# Patient Record
Sex: MALE | Race: WHITE | NOT HISPANIC OR LATINO
[De-identification: names, ages, dates, MRNs, and addresses within clinical notes are randomized per-mention and may not be internally consistent; named-entity substitution may affect disease eponyms.]

---

## 2017-11-20 ENCOUNTER — APPOINTMENT (OUTPATIENT)
Dept: ORTHOPEDIC SURGERY | Facility: CLINIC | Age: 77
End: 2017-11-20
Payer: MEDICARE

## 2017-11-20 VITALS
HEIGHT: 71 IN | DIASTOLIC BLOOD PRESSURE: 96 MMHG | SYSTOLIC BLOOD PRESSURE: 150 MMHG | HEART RATE: 87 BPM | WEIGHT: 200 LBS | BODY MASS INDEX: 28 KG/M2

## 2017-11-20 PROCEDURE — 99214 OFFICE O/P EST MOD 30 MIN: CPT

## 2017-11-20 RX ORDER — ALFUZOSIN HYDROCHLORIDE 10 MG/1
10 TABLET, EXTENDED RELEASE ORAL
Qty: 90 | Refills: 0 | Status: ACTIVE | COMMUNITY
Start: 2017-09-22

## 2017-11-20 RX ORDER — FINASTERIDE 5 MG/1
5 TABLET, FILM COATED ORAL
Qty: 90 | Refills: 0 | Status: ACTIVE | COMMUNITY
Start: 2017-09-22

## 2017-11-20 RX ORDER — DICYCLOMINE HYDROCHLORIDE 10 MG/1
10 CAPSULE ORAL
Qty: 270 | Refills: 0 | Status: ACTIVE | COMMUNITY
Start: 2017-11-03

## 2017-11-20 RX ORDER — SIMVASTATIN 20 MG/1
20 TABLET, FILM COATED ORAL
Qty: 90 | Refills: 0 | Status: ACTIVE | COMMUNITY
Start: 2017-10-16

## 2017-11-20 RX ORDER — SERTRALINE HYDROCHLORIDE 50 MG/1
50 TABLET, FILM COATED ORAL
Qty: 90 | Refills: 0 | Status: ACTIVE | COMMUNITY
Start: 2017-10-16

## 2019-09-16 ENCOUNTER — EMERGENCY (EMERGENCY)
Facility: HOSPITAL | Age: 79
LOS: 1 days | Discharge: HOME | End: 2019-09-16
Attending: STUDENT IN AN ORGANIZED HEALTH CARE EDUCATION/TRAINING PROGRAM
Payer: MEDICARE

## 2019-09-16 VITALS
RESPIRATION RATE: 19 BRPM | OXYGEN SATURATION: 97 % | HEART RATE: 81 BPM | DIASTOLIC BLOOD PRESSURE: 78 MMHG | SYSTOLIC BLOOD PRESSURE: 138 MMHG | WEIGHT: 205.03 LBS | TEMPERATURE: 98 F | HEIGHT: 71 IN

## 2019-09-16 PROCEDURE — 99283 EMERGENCY DEPT VISIT LOW MDM: CPT | Mod: GC

## 2019-09-16 PROCEDURE — 99283 EMERGENCY DEPT VISIT LOW MDM: CPT

## 2019-09-16 PROCEDURE — 72100 X-RAY EXAM L-S SPINE 2/3 VWS: CPT

## 2019-09-16 PROCEDURE — 72100 X-RAY EXAM L-S SPINE 2/3 VWS: CPT | Mod: 26

## 2019-09-16 RX ORDER — LIDOCAINE 4 G/100G
1 CREAM TOPICAL ONCE
Refills: 0 | Status: COMPLETED | OUTPATIENT
Start: 2019-09-16 | End: 2019-09-16

## 2019-09-16 RX ORDER — DIAZEPAM 5 MG
5 TABLET ORAL ONCE
Refills: 0 | Status: DISCONTINUED | OUTPATIENT
Start: 2019-09-16 | End: 2019-09-16

## 2019-09-16 RX ORDER — ACETAMINOPHEN 500 MG
650 TABLET ORAL ONCE
Refills: 0 | Status: COMPLETED | OUTPATIENT
Start: 2019-09-16 | End: 2019-09-16

## 2019-09-16 RX ORDER — DIAZEPAM 5 MG
1 TABLET ORAL
Qty: 3 | Refills: 0
Start: 2019-09-16 | End: 2019-09-18

## 2019-09-16 RX ADMIN — Medication 5 MILLIGRAM(S): at 09:44

## 2019-09-16 RX ADMIN — Medication 650 MILLIGRAM(S): at 09:44

## 2019-09-16 RX ADMIN — LIDOCAINE 1 PATCH: 4 CREAM TOPICAL at 09:45

## 2019-09-16 NOTE — ED PROVIDER NOTE - NSFOLLOWUPINSTRUCTIONS_ED_ALL_ED_FT
Please follow up with your primary care physician for further care and evaluation.  Please follow up with your spine specialist for further evaluation and care     If testing was performed in the Emergency Department, please bring copies of all test results to your doctor.  Please continue to take all home medications as previously prescribed.    Take valium 5mg once per day as needed for severe pain - do not drive while taking this medication.     Return to hospital for any new or concerning symptoms, including but not limited to: fevers, chills, nausea, vomiting, headache, dizziness, lightheadedness, chest pain, shortness of breath, difficulty breathing, abdominal pain, weakness, or any other new or concerning symptoms.    Take Tylenol up to 650 mg every 6 hours as needed for pain.  Take motrin 600mg every 6 hours as needed for pain

## 2019-09-16 NOTE — ED ADULT NURSE NOTE - NSIMPLEMENTINTERV_GEN_ALL_ED
Implemented All Universal Safety Interventions:  [place] to call system. Call bell, personal items and telephone within reach. Instruct patient to call for assistance. Room bathroom lighting operational. Non-slip footwear when patient is off stretcher. Physically safe environment: no spills, clutter or unnecessary equipment. Stretcher in lowest position, wheels locked, appropriate side rails in place.

## 2019-09-16 NOTE — ED PROVIDER NOTE - CLINICAL SUMMARY MEDICAL DECISION MAKING FREE TEXT BOX
78M, hx of HTN, HLD, polio as child, chronic back pain, presents w chief complaint of acute on chronic low back pain. No other associated sx. Exam as above. Low suspicion for acute pathology. Will perform screening lumbosacral xray, administer pain control, anticipate d/c home for patient to f/u with his primary medical doctor appt later today. Currently stable, no acute distress. Will continue to follow up and re-assess. Case discussed with Attending.  Maico Estrada MD, PGY3 Emergency Medicine

## 2019-09-16 NOTE — ED ADULT TRIAGE NOTE - CHIEF COMPLAINT QUOTE
nontraumatic, nonradiating back pain, +hx back pain, denies bowel/bladder incontinence, denies saddle anesthesia

## 2019-09-16 NOTE — ED PROVIDER NOTE - PROGRESS NOTE DETAILS
xray w/o acute pathology per wet read from radiology. Patient desires d/c home - will see his primary medical doctor today  Spoke with patient extensively regarding current differential diagnosis for ongoing symptoms, and patient acknowledged understanding. All questions and concerns have been addressed with the patient. I have discussed the plan for care and patient is in agreement. Patient is instructed to follow up with Primary Care Provider, and has been given strict return precautions.  Maico Estrada MD, PGY3 Emergency Medicine

## 2019-09-16 NOTE — ED PROVIDER NOTE - PATIENT PORTAL LINK FT
You can access the FollowMyHealth Patient Portal offered by [facility] by registering at the following website: [email or web address]. By joining [facility]’s FollowMyHealth portal, you will also be able to view your health information using other applications (apps) compatible with our system.

## 2019-09-16 NOTE — ED PROVIDER NOTE - PHYSICAL EXAMINATION
General: Well appearing, alert, oriented, no acute distress. Resting in bed.  HEENT: PERRLA EOMI. No trauma/bruising noted to head or face. No lip/tongue/throat swelling noted on exam.  CV: Regular rate and rhythm, S1/S2, +MURMUR.   Lungs: Clear to ascultation bilaterally, no wheezes/crackles/rales noted on exam. Equal chest wall excursion noted.   Abdomen: Soft, non tender, non distended, no guarding or rebound. No CVA tenderness to palpation.   MSK: Full ROM of upper and lower extremities bilaterally. Increased pain to midline low back with ROM of LLE. No tenderness to palpation to extremities. Full ROM of neck. No C-spine tenderness to palpation. Mild tenderness to palpation to midline sacral spine. No gross deformities noted to extremities.  Neuro: Awake, A+O x4, moving all extremities spontaneously. CN 2-12 grossly intact. No nystagmus noted. Strength and sensation grossly intact to all extremities. Ambulatory w/o assist  Extremities: No swelling or edema noted to extremities.   Skin: No rash or bruising noted on exam.

## 2019-09-16 NOTE — ED PROVIDER NOTE - OBJECTIVE STATEMENT
78M, hx o HTN, HLD, polio as child, chronic back pain, presents w chief complaint of low back pain. Patient reports a prior hx of chronic midline low back pain which has been stable and controlled with daily exercises. However, since Saturday 78M, hx of HTN, HLD, polio as child, chronic back pain, presents w chief complaint of low back pain. Patient reports a prior hx of chronic midline low back pain which has been stable and controlled with daily exercises. However, since Saturday morning, has been having severe midline low back pain. DENIES recent falls, trauma, heavy lifting, new exercises, strenuous exercise, or other known precipitating factors. DENIES leg weakness, leg numbness, saddle anesthesia, urinary or fecal incontinence, fevers or chills, nausea or vomiting, night sweats, hemoptysis, headache, dizziness, lightheadedness, blurry vision, chest pain, shortness of breath, cough, abdominal pain, urinary sx, diarrhea or constipation, bloody stools. Denies known cancer hx. Denies hx of back surgeries, vertebral fractures. Meds: statin. Denies tobacco, ethanol, or drug use, allergies.  Patient has scheduled appt to see his primary medical doctor today.

## 2019-09-16 NOTE — ED PROVIDER NOTE - ATTENDING CONTRIBUTION TO CARE
I performed a history and physical exam of the patient and discussed their management with the resident.  I reviewed the resident's note and agree with the documented findings and plan of care except as noted below. My medical decision making and observations are as follows:    78M, hx of HTN, HLD, polio as child, chronic back pain, presents w chief complaint of low back pain. Patient reports a prior hx of chronic midline low back pain which has been stable and controlled with daily exercises, but patient has had worse pain for past 2 days.  no hx of trauma or falls.  denies any f/c, weakness, bowel or bladder incontinence, numbness.  patient in NAD, A&O x 3, heart rrr with +murmur, lungs cta, abd soft ntnd, mild ttp of midline sacrum and left lumbar paraspinals, 5/5 strength all extremities, no saddle anesthesia, antalgic gait.  Due to mild midline tenderness in lumbo/sacral region will get xrays, give pain control and reassess.

## 2019-09-18 ENCOUNTER — APPOINTMENT (OUTPATIENT)
Dept: ORTHOPEDIC SURGERY | Facility: CLINIC | Age: 79
End: 2019-09-18
Payer: MEDICARE

## 2019-09-18 PROBLEM — E78.00 PURE HYPERCHOLESTEROLEMIA, UNSPECIFIED: Chronic | Status: ACTIVE | Noted: 2019-09-16

## 2019-09-18 PROCEDURE — 99214 OFFICE O/P EST MOD 30 MIN: CPT

## 2019-09-18 RX ORDER — METHYLPREDNISOLONE 4 MG/1
4 TABLET ORAL
Qty: 1 | Refills: 2 | Status: ACTIVE | COMMUNITY
Start: 2019-09-18 | End: 1900-01-01

## 2019-09-18 NOTE — HISTORY OF PRESENT ILLNESS
[7] : an average pain level of 7/10 [de-identified] : 77y/o male presents with low back pain for 3 days. Patient reports the pain does not radiate. \par He denies any numbness or tingling. X-ray at [facility] ER on Monday. He states he was prescribed Cyclobenzaprine 10mg, and Diazepam 5mg, which he has been taking, and have helped. He states he is able to walk better now. \par Changing positions from sitting to standing worsens the pain. \par \par Last seen in office on 11/20/2017. \par PT about 8-10 years ago, which helped. \par

## 2019-09-18 NOTE — DISCUSSION/SUMMARY
[de-identified] : 4 days of low back pain.\par lumbar sprain and strain\par lumbar degenerative disc disease\par Medrol and lumbar brochure.\par if no better MRI lumbar\par

## 2019-10-16 ENCOUNTER — APPOINTMENT (OUTPATIENT)
Dept: ORTHOPEDIC SURGERY | Facility: CLINIC | Age: 79
End: 2019-10-16
Payer: MEDICARE

## 2019-10-16 PROCEDURE — 99214 OFFICE O/P EST MOD 30 MIN: CPT

## 2019-10-16 NOTE — PHYSICAL EXAM
[ALL] : dorsalis pedis, posterior tibial, femoral, popliteal, and radial 2+ and symmetric bilaterally [UE/LE] : Sensory: Intact in bilateral upper & lower extremities [Normal] : Gait: normal [Poor Appearance] : well-appearing [Acute Distress] : not in acute distress [de-identified] : 5 out of 5 motor strength, sensation is intact and symmetrical full range of motion flexion extension and rotation, no palpatory tenderness full range of motion of hips knees shoulders and elbows (all four extremities), no atrophy, negative straight leg raise, no pathological reflexes, no swelling, normal ambulation, no apparent distress skin is intact, no palpable lymph nodes, no upper or lower extremity instability, alert and oriented x3 and normal mood. Normal finger-to nose test.  [de-identified] : EXAM: LUMBAR SPINE AP AND LATERAL \par PROCEDURE DATE: 09/16/2019 \par \par \par INTERPRETATION: EXAMINATION: 2 views of the lumbar spine \par \par CLINICAL INFORMATION: Midline back pain \par \par IMPRESSION: \par \par No compression fractures. Alignment is normal. \par \par Multilevel spondylosis characterized by varying levels of disc height loss \par and marginal osteophyte formation. \par \par Atherosclerotic vascular calcification. \par \par \par \par \par WILLIS MONTOYA M.D., ATTENDING RADIOLOGIST \par This document has been electronically signed. Sep 16 2019 11:04AM

## 2019-10-16 NOTE — HISTORY OF PRESENT ILLNESS
[7] : an average pain level of 7/10 [Improving] : improving [de-identified] : 79y/o male presents with acute low back pain x 4 weeks. \par Patient reports the pain does not radiate. \par He denies any numbness or tingling. X-ray at [facility] ER on Monday. He states he was prescribed Cyclobenzaprine 10mg, and Diazepam 5mg, which he has been taking, and have helped. He states he is able to walk better now. \par Changing positions from sitting to standing worsens the pain. \par No fever chills sweats nausea vomiting no bowel or bladder dysfunction, no recent weight loss or gain no night pain. This history is in addition to the intake form that I personally reviewed.

## 2019-10-16 NOTE — DISCUSSION/SUMMARY
[de-identified] : 4 weeks of low back pain.\par lumbar sprain and strain\par lumbar degenerative disc disease\par Medrol and lumbar brochure.\par lumbar brochure\par if no better MRI lumbar\par All options discussed including rest, medicine, home exercise, acupuncture, Chiropractic care, Physical Therapy, Pain management, and last resort surgery. \par All questions were answered, all alternatives discussed and the patient is in complete agreement with that plan. Follow-up appointment as instructed. Any issues and the patient will call or come in sooner.

## 2019-10-16 NOTE — ADDENDUM
[FreeTextEntry1] :  This note was authored by Susannah Durand working as a medical scribe for Dr. Kaveh Golden. The note was reviewed, edited, and revised by Dr. Kaveh Golden whom is in agreement with the exam findings, imaging findings, and treatment plan. Oct 16, 2019

## 2020-04-15 RX ORDER — METHYLPREDNISOLONE 4 MG/1
4 TABLET ORAL
Qty: 1 | Refills: 0 | Status: ACTIVE | COMMUNITY
Start: 2020-04-15 | End: 1900-01-01

## 2020-04-23 ENCOUNTER — APPOINTMENT (OUTPATIENT)
Dept: ORTHOPEDIC SURGERY | Facility: CLINIC | Age: 80
End: 2020-04-23
Payer: MEDICARE

## 2020-04-23 PROCEDURE — 99446 NTRPROF PH1/NTRNET/EHR 5-10: CPT

## 2020-04-23 NOTE — REASON FOR VISIT
[Verbal consent obtained from patient] : the patient, [unfilled] [Follow-Up Visit] : a follow-up visit for

## 2020-04-23 NOTE — DISCUSSION/SUMMARY
[de-identified] : lumbar degenerative disc disease\par discussed options\par feels good today\par patient wants lumbar MRI \par will take NSAIDs PRN\par Will call once MRI done\par discussed pain management\par All questions were answered, all alternatives discussed and the patient is in complete agreement with that plan. Follow-up appointment as instructed. Any issues and the patient will call or come in sooner.

## 2020-04-23 NOTE — PHYSICAL EXAM
[Normal] : Gait: normal [SLR] : negative straight leg raise [de-identified] : by phone, ambulating well, adequate strength in legs, heel and toe walks, adequate ROM lumbar A and O X 3. \par No swelling in legs, -SLR.

## 2020-04-27 ENCOUNTER — OUTPATIENT (OUTPATIENT)
Dept: OUTPATIENT SERVICES | Facility: HOSPITAL | Age: 80
LOS: 1 days | End: 2020-04-27
Payer: MEDICARE

## 2020-04-27 ENCOUNTER — TRANSCRIPTION ENCOUNTER (OUTPATIENT)
Age: 80
End: 2020-04-27

## 2020-04-27 ENCOUNTER — RESULT REVIEW (OUTPATIENT)
Age: 80
End: 2020-04-27

## 2020-04-27 ENCOUNTER — APPOINTMENT (OUTPATIENT)
Dept: MRI IMAGING | Facility: CLINIC | Age: 80
End: 2020-04-27
Payer: MEDICARE

## 2020-04-27 DIAGNOSIS — M51.36 OTHER INTERVERTEBRAL DISC DEGENERATION, LUMBAR REGION: ICD-10-CM

## 2020-04-27 PROCEDURE — 72148 MRI LUMBAR SPINE W/O DYE: CPT | Mod: 26

## 2020-04-27 PROCEDURE — 72148 MRI LUMBAR SPINE W/O DYE: CPT

## 2020-05-07 ENCOUNTER — APPOINTMENT (OUTPATIENT)
Dept: ORTHOPEDIC SURGERY | Facility: CLINIC | Age: 80
End: 2020-05-07
Payer: MEDICARE

## 2020-05-07 PROCEDURE — 99213 OFFICE O/P EST LOW 20 MIN: CPT

## 2020-05-07 RX ORDER — DICLOFENAC SODIUM 75 MG/1
75 TABLET, DELAYED RELEASE ORAL
Qty: 90 | Refills: 3 | Status: ACTIVE | COMMUNITY
Start: 2020-05-07 | End: 1900-01-01

## 2020-05-07 NOTE — PHYSICAL EXAM
[SLR] : negative straight leg raise [Normal] : Gait: normal [de-identified] : adequate lower extremity motor strength, sensation is intact and symmetrical full range of motion flexion extension and rotation, full range of motion of hips knees shoulders and elbows (all four extremities), no atrophy, negative straight leg raise, no swelling, normal ambulation, no apparent distress skin is intact, no upper or lower extremity instability, alert and oriented x3 and normal mood. Adequate heel and toe walk. [de-identified] : MRI lumbar-in system-right sided L5 impingement-reviewed with patient.

## 2020-05-07 NOTE — DISCUSSION/SUMMARY
[de-identified] : lumbar degenerative disc disease\par discussed all options\par feeling better\par NSAIDs PRN.\par F/U 2 weeks.\par All questions were answered, all alternatives discussed and the patient is in complete agreement with that plan. Follow-up appointment as instructed. Any issues and the patient will call or come in sooner.

## 2020-05-07 NOTE — HISTORY OF PRESENT ILLNESS
[Home] : at home, [unfilled] , at the time of the visit. [Other Location: e.g. Home (Enter Location, City,State)___] : at [unfilled] [Patient] : the patient [de-identified] : Called to discuss low back pain on right.\par Getting better with HEP and NSAIDs.\par Underwent lumbar MRI.\par Ambulating well.\par Advil helps.\par No fever chills sweats nausea vomiting no bowel or bladder dysfunction, no recent weight loss or gain no night pain. This history is in addition to the intake form that I personally reviewed.  [Improving] : improving

## 2020-05-21 ENCOUNTER — APPOINTMENT (OUTPATIENT)
Dept: ORTHOPEDIC SURGERY | Facility: CLINIC | Age: 80
End: 2020-05-21
Payer: MEDICARE

## 2020-05-21 PROCEDURE — 99442: CPT | Mod: 95

## 2020-05-21 NOTE — PHYSICAL EXAM
[de-identified] : adequate lower extremity motor strength, sensation is intact and symmetrical full range of motion flexion extension and rotation, no palpatory tenderness full range of motion of hips knees shoulders and elbows (all four extremities), no atrophy, negative straight leg raise, no swelling, normal ambulation, no apparent distress skin is intact, no upper or lower extremity instability, alert and oriented x 3 and normal mood.

## 2020-05-21 NOTE — DISCUSSION/SUMMARY
[de-identified] : Improved low back and leg pain.\par Continue with HEP\par NSAIDs PRN.\par F/U PRN.\par All questions were answered, all alternatives discussed and the patient is in complete agreement with that plan. Follow-up appointment as instructed. Any issues and the patient will call or come in sooner.

## 2020-05-21 NOTE — HISTORY OF PRESENT ILLNESS
[Home] : at home, [unfilled] , at the time of the visit. [Other Location: e.g. Home (Enter Location, City,State)___] : at [unfilled] [Verbal consent obtained from patient] : the patient, [unfilled] [Improving] : improving [de-identified] : Feeling better.\par Low back pain.\par Much better.\par Doing HEP daily.\par Stopped the medicines.\par 70% better.\par Walking miles per weekly. \par No fever chills sweats nausea vomiting no bowel or bladder dysfunction, no recent weight loss or gain no night pain. This history is in addition to the intake form that I personally reviewed.

## 2020-07-20 ENCOUNTER — APPOINTMENT (OUTPATIENT)
Dept: ORTHOPEDIC SURGERY | Facility: CLINIC | Age: 80
End: 2020-07-20
Payer: MEDICARE

## 2020-07-20 PROCEDURE — 99214 OFFICE O/P EST MOD 30 MIN: CPT | Mod: 25

## 2020-07-20 PROCEDURE — 20552 NJX 1/MLT TRIGGER POINT 1/2: CPT

## 2020-08-10 ENCOUNTER — APPOINTMENT (OUTPATIENT)
Dept: ORTHOPEDIC SURGERY | Facility: CLINIC | Age: 80
End: 2020-08-10
Payer: MEDICARE

## 2020-08-10 VITALS
WEIGHT: 200 LBS | DIASTOLIC BLOOD PRESSURE: 76 MMHG | HEIGHT: 72 IN | SYSTOLIC BLOOD PRESSURE: 133 MMHG | BODY MASS INDEX: 27.09 KG/M2 | TEMPERATURE: 98 F | HEART RATE: 83 BPM

## 2020-08-10 PROCEDURE — 20552 NJX 1/MLT TRIGGER POINT 1/2: CPT

## 2020-08-10 PROCEDURE — 99214 OFFICE O/P EST MOD 30 MIN: CPT | Mod: 25

## 2021-03-15 ENCOUNTER — APPOINTMENT (OUTPATIENT)
Dept: ORTHOPEDIC SURGERY | Facility: CLINIC | Age: 81
End: 2021-03-15
Payer: MEDICARE

## 2021-03-15 VITALS
HEIGHT: 72 IN | DIASTOLIC BLOOD PRESSURE: 81 MMHG | BODY MASS INDEX: 27.09 KG/M2 | HEART RATE: 80 BPM | SYSTOLIC BLOOD PRESSURE: 160 MMHG | WEIGHT: 200 LBS

## 2021-03-15 PROCEDURE — 99214 OFFICE O/P EST MOD 30 MIN: CPT | Mod: 25

## 2021-03-15 PROCEDURE — 20553 NJX 1/MLT TRIGGER POINTS 3/>: CPT

## 2021-05-21 ENCOUNTER — APPOINTMENT (OUTPATIENT)
Dept: ORTHOPEDIC SURGERY | Facility: CLINIC | Age: 81
End: 2021-05-21
Payer: MEDICARE

## 2021-05-21 DIAGNOSIS — M54.5 LOW BACK PAIN: ICD-10-CM

## 2021-05-21 PROCEDURE — 99214 OFFICE O/P EST MOD 30 MIN: CPT | Mod: 25

## 2021-05-21 PROCEDURE — 20553 NJX 1/MLT TRIGGER POINTS 3/>: CPT

## 2021-07-21 ENCOUNTER — NON-APPOINTMENT (OUTPATIENT)
Age: 81
End: 2021-07-21

## 2021-07-26 ENCOUNTER — APPOINTMENT (OUTPATIENT)
Dept: ORTHOPEDIC SURGERY | Facility: CLINIC | Age: 81
End: 2021-07-26
Payer: MEDICARE

## 2021-07-26 VITALS — SYSTOLIC BLOOD PRESSURE: 124 MMHG | DIASTOLIC BLOOD PRESSURE: 80 MMHG | HEART RATE: 79 BPM | HEIGHT: 72 IN

## 2021-07-26 PROCEDURE — 99214 OFFICE O/P EST MOD 30 MIN: CPT

## 2022-04-25 ENCOUNTER — APPOINTMENT (OUTPATIENT)
Dept: ORTHOPEDIC SURGERY | Facility: CLINIC | Age: 82
End: 2022-04-25
Payer: MEDICARE

## 2022-04-25 VITALS
DIASTOLIC BLOOD PRESSURE: 73 MMHG | HEIGHT: 72 IN | WEIGHT: 202 LBS | HEART RATE: 87 BPM | SYSTOLIC BLOOD PRESSURE: 126 MMHG | BODY MASS INDEX: 27.36 KG/M2

## 2022-04-25 PROCEDURE — 99214 OFFICE O/P EST MOD 30 MIN: CPT | Mod: 25

## 2022-04-25 PROCEDURE — 20552 NJX 1/MLT TRIGGER POINT 1/2: CPT

## 2022-04-25 NOTE — HISTORY OF PRESENT ILLNESS
[Improving] : improving [de-identified] : 79 year male presents for follow up evaluation of lower back pain, has history of lumbar degenerative disease. \dominique Reports left side of back is better than right side.\par Was given B/L SI joint injectionin March, as well as in Aug 2020 and May 2021. \par Denies radiation of pain down the legs. \par Denies numbness/tingling. \par Bending aggravates the pain. \par Takes advil and has relief.\par Has not participated with PT or Chiropractic care. Has been doing lumbar HEP which he states helps.\par Has had six session of acupuncture which he states has caused him tingling in his right leg when he sits which resolves with walking.\par Recieved 3 epidural with Dr. Malone.  Last one was 2/2022 which helped.\par He also states he had 3 trigger point injections in Dr. Arango office which provided some relief.\par No fever chills sweats nausea vomiting no bowel or bladder dysfunction, no recent weight loss or gain no night pain. This history is in addition to the intake form that I personally reviewed.

## 2022-04-25 NOTE — DISCUSSION/SUMMARY
[de-identified] : Lumbar degenerative disc disease.\par Right sacroiliitis.\par Discussed all options.\par Diclofenac PRN. \par All options discussed including rest, medicine, home exercise, acupuncture, Chiropractic care, Physical Therapy, Pain management, and last resort surgery.\par All questions were answered, all alternatives discussed and the patient is in complete agreement with that plan. Follow-up appointment as instructed. Any issues and the patient will call or come in sooner.

## 2022-04-25 NOTE — PHYSICAL EXAM
[Normal] : Gait: normal [Wilson's Sign] : negative Wilson's sign [Pronator Drift] : negative pronator drift [SLR] : negative straight leg raise [de-identified] : 5 out of 5 motor strength, sensation is intact and symmetrical full range of motion flexion extension and rotation, no palpatory tenderness full range of motion of hips knees shoulders and elbows (all four extremities), no atrophy, negative straight leg raise, no pathological reflexes, no swelling, normal ambulation, no apparent distress skin is intact, no palpable lymph nodes, no upper or lower extremity instability, alert and oriented x3 and normal mood. Normal finger-to nose test. Pain over right SI joint pain.  [de-identified] : MR SPINE LUMBAR 04/27/2020 (PACS)\par \par FINDINGS: \par \par ALIGNMENT: There is straightening of the lumbar lordosis. There is trace \par retrolisthesis of L5 on S1. There is mild levoscoliosis. \par \par VERTEBRAL BODIES: No acute compression deformity. \par \par DISC SPACES: There is multilevel disc desiccation with multilevel moderate \par disc height loss. Scattered Schmorl's node formation is noted. There is mild \par acute appearing Schmorl's node along the inferior endplate of L2. Mixed \par fatty and edematous end plate changes are seen at L2/L3 and L3/L4. Fatty end \par plate changes are seen at L4/L5 and L5/S1. \par \par MARROW: Within normal limits. \par \par SACROILIAC JOINTS: Intact. \par \par CONUS AND CAUDA EQUINA: Conus is normal in morphology terminating at the \par level of L1/L2. \par \par IMAGED ABDOMINAL AND PELVIC STRUCTURES: Parapelvic renal cysts are seen \par bilaterally. \par \par The findings at the individual levels are as follows: \par \par T12/L1: This level is imaged only in the sagittal plane. There is a minimal \par disc bulge resulting in slight flattening of the ventral thecal sac. There \par is no neural foraminal narrowing. \par \par L1/2: There is a mild diffuse disc bulge with bilateral facet arthrosis. \par Findings result in mild to moderate bilateral neural foraminal narrowing. \par There is mild spinal canal narrowing. \par \par L2/3: There is a mild diffuse disc bulge with a superimposed right foraminal \par disc protrusion. There is bilateral facet arthrosis and ligamentum flavum \par hypertrophy. Findings result in mild to moderate spinal canal narrowing. \par There is bilateral neural foraminal narrowing, moderate on the right and \par mild on the left. \par \par L3/4: There is a diffuse disc bulge which is asymmetric to the left. There \par is posterior osseous ridging. There is bilateral facet arthrosis. Findings \par result in mild to moderate bilateral neural foraminal narrowing. There is \par effacement of the bilateral lateral recesses with flattening of the ventral \par thecal sac. There is mild central canal narrowing. \par \par L4/5: There is a diffuse disc bulge with central annular fissure. There is \par posterior osseous ridging and bilateral facet arthrosis. There is a \par superimposed right paracentral disc protrusion. Findings results in \par effacement of the right lateral recess with mass effect upon the descending \par right L5 nerve root. There is mild central canal narrowing. There is \par bilateral neural foraminal narrowing, moderate to severe on the right and \par moderate on the left. \par \par L5/S1: There is a mild diffuse disc bulge with posterior osseous ridging and \par bilateral facet arthrosis, severe on the right and mild on the left. \par Findings result in effacement of the bilateral lateral recesses. There is no \par central canal narrowing. There is moderate to severe bilateral neural \par foraminal narrowing. \par \par IMPRESSION: \par \par Multilevel lumbar spondylosis. Straightening of the lumbar lordosis. Trace \par levoscoliosis. \par \par L2/3: There is mild to moderate spinal canal narrowing. There is bilateral \par neural foraminal narrowing, moderate on the right and mild on the left. \par \par L3/4: There is mild to moderate bilateral neural foraminal narrowing. There \par is effacement of the bilateral lateral recesses with flattening of the \par ventral thecal sac. \par \par L4/5: There is effacement of the right lateral recess with mass effect upon \par the descending right L5 nerve root. There is mild central canal narrowing. \par There is bilateral neural foraminal narrowing, moderate to severe on the \par right and moderate on the left. \par \par L5/S1: There is effacement of the bilateral lateral recesses. There is \par moderate to severe bilateral neural foraminal narrowing. \par

## 2022-05-25 ENCOUNTER — RX RENEWAL (OUTPATIENT)
Age: 82
End: 2022-05-25

## 2022-06-05 PROBLEM — M54.16 LUMBAR RADICULOPATHY: Status: ACTIVE | Noted: 2020-05-21

## 2022-06-06 ENCOUNTER — APPOINTMENT (OUTPATIENT)
Dept: ORTHOPEDIC SURGERY | Facility: CLINIC | Age: 82
End: 2022-06-06

## 2022-06-06 VITALS
WEIGHT: 198 LBS | HEART RATE: 84 BPM | HEIGHT: 72 IN | DIASTOLIC BLOOD PRESSURE: 70 MMHG | SYSTOLIC BLOOD PRESSURE: 115 MMHG | BODY MASS INDEX: 26.82 KG/M2

## 2022-06-06 DIAGNOSIS — M54.16 RADICULOPATHY, LUMBAR REGION: ICD-10-CM

## 2022-06-06 PROCEDURE — 99214 OFFICE O/P EST MOD 30 MIN: CPT | Mod: 25

## 2022-06-06 PROCEDURE — 20552 NJX 1/MLT TRIGGER POINT 1/2: CPT

## 2022-06-06 NOTE — ADDENDUM
[FreeTextEntry1] : This note was written by Elena Cassidy on 06/06/2022 acting as scribe for Dr. Kaveh Golden M.D.\par \par I, Kaveh Golden MD, have read and attest that all the information, medical decision making and discharge instructions within are true and accurate.

## 2022-06-06 NOTE — HISTORY OF PRESENT ILLNESS
[Improving] : improving [de-identified] : 81 year male presents for follow up evaluation of lower back pain, has history of lumbar degenerative disease. \dominique Reports left side of back is better than right side. He notes that he has had continued pain because he has been helping his wife since a knee replacement on 03/15 and a revision about 3 weeks ago. \dominique Had LESI about 2-3 months ago and did get significant left sided relief. \dominique Was given B/L SI joint injection in March, as well as in Aug 2020 and May 2021. \dominique Denies radiation of pain down the legs. He does endorse some tingling in the right leg around the knee and distally.  \par Denies numbness/tingling. \par Bending aggravates the pain. \dominique Takes Advil and has relief.\dominique Has not participated with PT or Chiropractic care. Has been doing lumbar HEP which he states helps.\dominique Has had six session of acupuncture which he states has caused him tingling in his right leg when he sits which resolves with walking.\par Recieved 3 epidural with Dr. Malone.  Last one was 2/2022 which helped.\par He also states he had 3 trigger point injections in Dr. Arango office which provided some relief.\par No fever chills sweats nausea vomiting no bowel or bladder dysfunction, no recent weight loss or gain no night pain. This history is in addition to the intake form that I personally reviewed.

## 2022-06-06 NOTE — DISCUSSION/SUMMARY
[de-identified] : Lumbar degenerative disc disease.\par Right sacroiliitis.\par Discussed all options.\par Diclofenac PRN. \par I offered an injection after all risks were explained including allergic reaction to an infection under sterile conditions 1 mg of Depo-Medrol and 2 cc of 1% lidocaine without epinephrine was injected into the painful site. The patient tolerated the procedure well and received significant relief following the injection.\par All options discussed including rest, medicine, home exercise, acupuncture, Chiropractic care, Physical Therapy, Pain management, and last resort surgery.\par All questions were answered, all alternatives discussed and the patient is in complete agreement with that plan. Follow-up appointment as instructed. Any issues and the patient will call or come in sooner.

## 2022-06-06 NOTE — PHYSICAL EXAM
[Normal] : Gait: normal [Wilson's Sign] : negative Wilson's sign [Pronator Drift] : negative pronator drift [SLR] : negative straight leg raise [de-identified] : 5 out of 5 motor strength, sensation is intact and symmetrical full range of motion flexion extension and rotation, no palpatory tenderness full range of motion of hips knees shoulders and elbows (all four extremities), no atrophy, negative straight leg raise, no pathological reflexes, no swelling, normal ambulation, no apparent distress skin is intact, no palpable lymph nodes, no upper or lower extremity instability, alert and oriented x3 and normal mood. Normal finger-to nose test. Pain over right SI joint pain.

## 2022-09-02 ENCOUNTER — APPOINTMENT (OUTPATIENT)
Dept: ORTHOPEDIC SURGERY | Facility: CLINIC | Age: 82
End: 2022-09-02

## 2022-09-07 ENCOUNTER — APPOINTMENT (OUTPATIENT)
Dept: ORTHOPEDIC SURGERY | Facility: CLINIC | Age: 82
End: 2022-09-07

## 2022-09-07 VITALS — BODY MASS INDEX: 25.73 KG/M2 | WEIGHT: 190 LBS | HEIGHT: 72 IN

## 2022-09-07 PROCEDURE — 20552 NJX 1/MLT TRIGGER POINT 1/2: CPT

## 2022-09-07 PROCEDURE — 99214 OFFICE O/P EST MOD 30 MIN: CPT | Mod: 25

## 2022-09-07 NOTE — HISTORY OF PRESENT ILLNESS
[Improving] : improving [de-identified] : 81 year male presents for follow up evaluation of lower back pain, hx of lumbar degenerative disease. \dominique Reports left side of back is better than right side. He notes that he has had continued pain because he has been helping his wife since a knee replacement on 03/15 and a revision about 3 weeks ago. \dominique Had LESI about 2-3 months ago and did get significant left sided relief. \dominique Was given B/L SI joint injection in March, as well as in Aug 2020 and May 2021. \dominique Denies radiation of pain down the legs. He does endorse some tingling in the right leg around the knee and distally.  \dominique Denies numbness/tingling. \par Bending aggravates the pain. \dominique Takes Advil and has relief.\dominique Has not participated with PT or Chiropractic care. Has been doing lumbar HEP which he states helps.\dominique Has had six session of acupuncture which he states has caused him tingling in his right leg when he sits which resolves with walking.\par Recieved 3 epidural with Dr. Malone.  Last one was 2/2022 which helped.\par He also states he had 3 trigger point injections in Dr. Arango office which provided some relief.\par No fever chills sweats nausea vomiting no bowel or bladder dysfunction, no recent weight loss or gain no night pain. This history is in addition to the intake form that I personally reviewed.

## 2022-09-07 NOTE — ADDENDUM
[FreeTextEntry1] : This note was written by Oral Dillon on 09/07/2022 acting as scribe for Dr. Kaveh Golden M.D.\par \par I, Kaveh Golden MD, have read and attest that all the information, medical decision making and discharge instructions within are true and accurate.

## 2022-09-07 NOTE — DISCUSSION/SUMMARY
[de-identified] : Lumbar degenerative disc disease.\par Bilateral sacroiliitis.\par Discussed all options.\par Diclofenac PRN. \par I offered an injection after all risks were explained including allergic reaction to an infection under sterile conditions 1 mg of Depo-Medrol and 2 cc of 1% Lidocaine without epinephrine was injected into the painful site. The patient tolerated the procedure well and received significant relief following the injection. x2\par All options discussed including rest, medicine, home exercise, acupuncture, Chiropractic care, Physical Therapy, Pain management, and last resort surgery. All questions were answered, all alternatives discussed and the patient is in complete agreement with that plan. Follow-up appointment as instructed. Any issues and the patient will call or come in sooner.

## 2022-09-07 NOTE — PHYSICAL EXAM
[Normal] : Gait: normal [Wilson's Sign] : negative Wilson's sign [Pronator Drift] : negative pronator drift [SLR] : negative straight leg raise [de-identified] : 5 out of 5 motor strength, sensation is intact and symmetrical full range of motion flexion extension and rotation, no palpatory tenderness full range of motion of hips knees shoulders and elbows (all four extremities), no atrophy, negative straight leg raise, no pathological reflexes, no swelling, normal ambulation, no apparent distress skin is intact, no palpable lymph nodes, no upper or lower extremity instability, alert and oriented x3 and normal mood. Normal finger-to nose test. \par Pain over bilateral SI joints.

## 2022-09-30 ENCOUNTER — APPOINTMENT (OUTPATIENT)
Dept: ORTHOPEDIC SURGERY | Facility: CLINIC | Age: 82
End: 2022-09-30

## 2022-10-12 ENCOUNTER — RX RENEWAL (OUTPATIENT)
Age: 82
End: 2022-10-12

## 2022-11-08 ENCOUNTER — RX RENEWAL (OUTPATIENT)
Age: 82
End: 2022-11-08

## 2022-11-28 ENCOUNTER — APPOINTMENT (OUTPATIENT)
Dept: ORTHOPEDIC SURGERY | Facility: CLINIC | Age: 82
End: 2022-11-28

## 2022-11-30 ENCOUNTER — APPOINTMENT (OUTPATIENT)
Dept: ORTHOPEDIC SURGERY | Facility: CLINIC | Age: 82
End: 2022-11-30

## 2022-11-30 VITALS
HEART RATE: 87 BPM | BODY MASS INDEX: 26.41 KG/M2 | HEIGHT: 72 IN | SYSTOLIC BLOOD PRESSURE: 116 MMHG | TEMPERATURE: 98.1 F | WEIGHT: 195 LBS | OXYGEN SATURATION: 97 % | DIASTOLIC BLOOD PRESSURE: 76 MMHG

## 2022-11-30 PROCEDURE — 99214 OFFICE O/P EST MOD 30 MIN: CPT | Mod: 25

## 2022-11-30 PROCEDURE — 20552 NJX 1/MLT TRIGGER POINT 1/2: CPT

## 2022-11-30 NOTE — PHYSICAL EXAM
[Wilson's Sign] : negative Wilson's sign [Pronator Drift] : negative pronator drift [SLR] : negative straight leg raise [de-identified] : 5 out of 5 motor strength, sensation is intact and symmetrical full range of motion flexion extension and rotation, no palpatory tenderness full range of motion of hips knees shoulders and elbows (all four extremities), no atrophy, negative straight leg raise, no pathological reflexes, no swelling, normal ambulation, no apparent distress skin is intact, no palpable lymph nodes, no upper or lower extremity instability, alert and oriented x3 and normal mood. Normal finger-to nose test. \par Pain over bilateral SI joints.

## 2022-11-30 NOTE — DISCUSSION/SUMMARY
[de-identified] : Lumbar degenerative disc disease.\par Bilateral sacroiliitis.\par Feeling better after episode he had yesterday.\par Discussed all options.\par Diclofenac. \par I offered an injection after all risks were explained including allergic reaction to an infection under sterile conditions 1 mg of Depo-Medrol and 2 cc of 1% Lidocaine without epinephrine was injected into the painful site. The patient tolerated the procedure well and received significant relief following the injection. \par All options discussed including rest, medicine, home exercise, acupuncture, Chiropractic care, Physical Therapy, Pain management, and last resort surgery. All questions were answered, all alternatives discussed and the patient is in complete agreement with that plan. Follow-up appointment as instructed. Any issues and the patient will call or come in sooner.

## 2022-11-30 NOTE — HISTORY OF PRESENT ILLNESS
[de-identified] : 81 year male presents for follow up evaluation of lower back pain, hx of lumbar degenerative disease. \dominique Was given B/L SI joint injection in March, as well as in Aug 2020 and May 2021. Most recent injection in Sept 2022. \dominique Had a recent episode of left sided back pain yesterday that improved with heat. \par Denies radiation of pain down the legs.\par Denies numbness/tingling. \par Bending aggravates the pain. \dominique Takes Advil and has relief.\dominique Has not participated with PT or Chiropractic care. Has been doing lumbar HEP which he states helps.\dominique Has had six session of acupuncture which he states has caused him tingling in his right leg when he sits which resolves with walking.\par Recieved 3 epidural with Dr. Malone.  Last one was 2/2022 which helped.\par He also states he had 3 trigger point injections in Dr. Arango office which provided some relief.\par No fever chills sweats nausea vomiting no bowel or bladder dysfunction, no recent weight loss or gain no night pain. This history is in addition to the intake form that I personally reviewed.

## 2022-11-30 NOTE — ADDENDUM
[FreeTextEntry1] : This note was written by Oral Dillon on 11/30/2022 acting as scribe for Dr. Kaveh Golden M.D.\par \par I, Kaveh Golden MD, have read and attest that all the information, medical decision making and discharge instructions within are true and accurate.

## 2022-12-07 ENCOUNTER — RX RENEWAL (OUTPATIENT)
Age: 82
End: 2022-12-07

## 2023-01-17 ENCOUNTER — RX RENEWAL (OUTPATIENT)
Age: 83
End: 2023-01-17

## 2023-01-18 ENCOUNTER — RX RENEWAL (OUTPATIENT)
Age: 83
End: 2023-01-18

## 2023-05-20 ENCOUNTER — NON-APPOINTMENT (OUTPATIENT)
Age: 83
End: 2023-05-20

## 2023-05-22 ENCOUNTER — APPOINTMENT (OUTPATIENT)
Dept: ORTHOPEDIC SURGERY | Facility: CLINIC | Age: 83
End: 2023-05-22
Payer: MEDICARE

## 2023-05-22 VITALS
HEART RATE: 98 BPM | OXYGEN SATURATION: 98 % | WEIGHT: 185 LBS | SYSTOLIC BLOOD PRESSURE: 121 MMHG | TEMPERATURE: 97.6 F | HEIGHT: 71 IN | DIASTOLIC BLOOD PRESSURE: 74 MMHG | BODY MASS INDEX: 25.9 KG/M2

## 2023-05-22 DIAGNOSIS — M79.10 MYALGIA, UNSPECIFIED SITE: ICD-10-CM

## 2023-05-22 DIAGNOSIS — M60.9 MYOSITIS, UNSPECIFIED: ICD-10-CM

## 2023-05-22 DIAGNOSIS — M51.36 OTHER INTERVERTEBRAL DISC DEGENERATION, LUMBAR REGION: ICD-10-CM

## 2023-05-22 PROCEDURE — 99214 OFFICE O/P EST MOD 30 MIN: CPT | Mod: 25

## 2023-05-22 PROCEDURE — 20552 NJX 1/MLT TRIGGER POINT 1/2: CPT

## 2023-05-31 ENCOUNTER — RX RENEWAL (OUTPATIENT)
Age: 83
End: 2023-05-31

## 2023-05-31 RX ORDER — DICLOFENAC SODIUM 75 MG/1
75 TABLET, DELAYED RELEASE ORAL
Qty: 60 | Refills: 0 | Status: ACTIVE | COMMUNITY
Start: 2022-04-25 | End: 1900-01-01

## 2024-03-13 NOTE — ED PROVIDER NOTE - GASTROINTESTINAL NEGATIVE STATEMENT, MLM
Patient called with . Explained results and recommendations. Patient verbalized understanding. Patient wrote down recommended supplements but requested that they all be sent to the pharmacy. Scripts sent to preferred pharmacy. Assisted patient in setting up lab appointment.    no abdominal pain, no bloating, no constipation, no diarrhea, no nausea and no vomiting.

## 2024-10-10 ENCOUNTER — APPOINTMENT (OUTPATIENT)
Dept: ORTHOPEDIC SURGERY | Facility: CLINIC | Age: 84
End: 2024-10-10

## 2025-09-06 ENCOUNTER — NON-APPOINTMENT (OUTPATIENT)
Age: 85
End: 2025-09-06

## 2025-09-09 ENCOUNTER — NON-APPOINTMENT (OUTPATIENT)
Age: 85
End: 2025-09-09

## 2025-09-11 ENCOUNTER — APPOINTMENT (OUTPATIENT)
Dept: ORTHOPEDIC SURGERY | Facility: CLINIC | Age: 85
End: 2025-09-11
Payer: MEDICARE

## 2025-09-11 VITALS
HEART RATE: 72 BPM | HEIGHT: 71 IN | WEIGHT: 190 LBS | DIASTOLIC BLOOD PRESSURE: 82 MMHG | BODY MASS INDEX: 26.6 KG/M2 | OXYGEN SATURATION: 99 % | SYSTOLIC BLOOD PRESSURE: 134 MMHG

## 2025-09-11 DIAGNOSIS — M51.369: ICD-10-CM

## 2025-09-11 PROCEDURE — 99214 OFFICE O/P EST MOD 30 MIN: CPT
